# Patient Record
Sex: MALE | Race: WHITE | NOT HISPANIC OR LATINO | URBAN - METROPOLITAN AREA
[De-identification: names, ages, dates, MRNs, and addresses within clinical notes are randomized per-mention and may not be internally consistent; named-entity substitution may affect disease eponyms.]

---

## 2019-11-13 RX ORDER — FINASTERIDE 5 MG/1
5 TABLET, FILM COATED ORAL DAILY
Qty: 30 TABLET | Refills: 3 | Status: SHIPPED | OUTPATIENT
Start: 2019-11-13 | End: 2020-04-20 | Stop reason: SDUPTHER

## 2019-11-13 RX ORDER — TAMSULOSIN HYDROCHLORIDE 0.4 MG/1
0.4 CAPSULE ORAL DAILY
Qty: 30 CAPSULE | Refills: 3 | Status: SHIPPED | OUTPATIENT
Start: 2019-11-13 | End: 2020-03-03 | Stop reason: SDUPTHER

## 2019-11-13 NOTE — TELEPHONE ENCOUNTER
----- Message from Vincenzo Deutsch sent at 11/12/2019  2:42 PM CST -----  Contact: PT'S WIFE  Pt/s wife called asking for medicine (flomax and one other I didn't recognize) to be sent to [facility] in [place] , she said it was a new place , she also asked to be called back on    [phone]    When linda have been able to send the order , thank you

## 2020-03-04 RX ORDER — TAMSULOSIN HYDROCHLORIDE 0.4 MG/1
0.4 CAPSULE ORAL DAILY
Qty: 30 CAPSULE | Refills: 0 | Status: SHIPPED | OUTPATIENT
Start: 2020-03-04 | End: 2020-04-08 | Stop reason: SDUPTHER

## 2020-03-11 RX ORDER — FINASTERIDE 5 MG/1
5 TABLET, FILM COATED ORAL DAILY
Qty: 30 TABLET | Refills: 0 | Status: SHIPPED | OUTPATIENT
Start: 2020-03-11 | End: 2020-04-08 | Stop reason: SDUPTHER

## 2020-04-09 RX ORDER — FINASTERIDE 5 MG/1
5 TABLET, FILM COATED ORAL DAILY
Qty: 30 TABLET | Refills: 0 | Status: SHIPPED | OUTPATIENT
Start: 2020-04-09 | End: 2020-04-20 | Stop reason: SDUPTHER

## 2020-04-09 RX ORDER — TAMSULOSIN HYDROCHLORIDE 0.4 MG/1
0.4 CAPSULE ORAL DAILY
Qty: 30 CAPSULE | Refills: 0 | Status: SHIPPED | OUTPATIENT
Start: 2020-04-09 | End: 2020-04-20 | Stop reason: SDUPTHER

## 2020-04-20 ENCOUNTER — OFFICE VISIT (OUTPATIENT)
Dept: UROLOGY | Facility: CLINIC | Age: 75
End: 2020-04-20
Payer: MEDICARE

## 2020-04-20 VITALS
DIASTOLIC BLOOD PRESSURE: 70 MMHG | OXYGEN SATURATION: 95 % | HEART RATE: 81 BPM | TEMPERATURE: 99 F | SYSTOLIC BLOOD PRESSURE: 133 MMHG

## 2020-04-20 DIAGNOSIS — N40.1 BPH WITH URINARY OBSTRUCTION: Primary | ICD-10-CM

## 2020-04-20 DIAGNOSIS — R39.15 URINARY URGENCY: ICD-10-CM

## 2020-04-20 DIAGNOSIS — N13.8 BPH WITH URINARY OBSTRUCTION: Primary | ICD-10-CM

## 2020-04-20 LAB
BILIRUB UR QL STRIP: NEGATIVE
GLUCOSE UR QL STRIP: POSITIVE
KETONES UR QL STRIP: NEGATIVE
LEUKOCYTE ESTERASE UR QL STRIP: NEGATIVE
PH, POC UA: 5.5
POC AMORP, URINE: ABNORMAL
POC BACTI, URINE: ABNORMAL
POC BLOOD, URINE: NEGATIVE
POC CASTS, URINE: ABNORMAL
POC CRYST, URINE: ABNORMAL
POC EPITH, URINE: ABNORMAL
POC HCG, URINE: ABNORMAL
POC HYALIN, URINE: ABNORMAL LPF
POC MUCUS, URINE: ABNORMAL
POC NITRATES, URINE: NEGATIVE
POC OTHER, URINE: ABNORMAL
POC RBC, URINE: ABNORMAL HPF
POC RESIDUAL URINE VOLUME: 1 ML (ref 0–100)
POC WBC, URINE: ABNORMAL HPF
PROT UR QL STRIP: NEGATIVE
PSA, DIAGNOSTIC: 0.11 NG/ML (ref 0–4)
SP GR UR STRIP: 1.02 (ref 1–1.03)
UROBILINOGEN UR STRIP-ACNC: 0.2 (ref 0.3–2.2)

## 2020-04-20 PROCEDURE — 99214 PR OFFICE/OUTPT VISIT, EST, LEVL IV, 30-39 MIN: ICD-10-PCS | Mod: 25,S$GLB,, | Performed by: NURSE PRACTITIONER

## 2020-04-20 PROCEDURE — 99214 OFFICE O/P EST MOD 30 MIN: CPT | Mod: 25,S$GLB,, | Performed by: NURSE PRACTITIONER

## 2020-04-20 PROCEDURE — 51798 US URINE CAPACITY MEASURE: CPT | Mod: S$GLB,,, | Performed by: NURSE PRACTITIONER

## 2020-04-20 PROCEDURE — 51798 POCT BLADDER SCAN: ICD-10-PCS | Mod: S$GLB,,, | Performed by: NURSE PRACTITIONER

## 2020-04-20 RX ORDER — SITAGLIPTIN AND METFORMIN HYDROCHLORIDE 1000; 50 MG/1; MG/1
TABLET, FILM COATED ORAL
COMMUNITY
Start: 2020-04-13 | End: 2021-05-03 | Stop reason: ALTCHOICE

## 2020-04-20 RX ORDER — DULAGLUTIDE 0.75 MG/.5ML
INJECTION, SOLUTION SUBCUTANEOUS
COMMUNITY
Start: 2020-04-13 | End: 2022-01-13 | Stop reason: ALTCHOICE

## 2020-04-20 RX ORDER — METOPROLOL SUCCINATE 25 MG/1
25 TABLET, EXTENDED RELEASE ORAL DAILY
COMMUNITY
Start: 2020-04-09

## 2020-04-20 RX ORDER — LEVETIRACETAM 250 MG/1
TABLET ORAL
COMMUNITY
Start: 2020-03-27 | End: 2022-07-18

## 2020-04-20 RX ORDER — APIXABAN 5 MG/1
TABLET, FILM COATED ORAL
COMMUNITY
Start: 2020-04-13

## 2020-04-20 RX ORDER — ASPIRIN 81 MG/1
81 TABLET ORAL
COMMUNITY

## 2020-04-20 RX ORDER — GABAPENTIN 100 MG/1
CAPSULE ORAL
COMMUNITY
Start: 2020-03-27

## 2020-04-20 RX ORDER — TAMSULOSIN HYDROCHLORIDE 0.4 MG/1
0.4 CAPSULE ORAL DAILY
Qty: 30 CAPSULE | Refills: 11 | Status: SHIPPED | OUTPATIENT
Start: 2020-04-20 | End: 2021-04-20

## 2020-04-20 RX ORDER — ATORVASTATIN CALCIUM 40 MG/1
TABLET, FILM COATED ORAL
COMMUNITY
Start: 2020-03-27

## 2020-04-20 RX ORDER — DULOXETIN HYDROCHLORIDE 60 MG/1
CAPSULE, DELAYED RELEASE ORAL
COMMUNITY
Start: 2020-04-09

## 2020-04-20 RX ORDER — FINASTERIDE 5 MG/1
5 TABLET, FILM COATED ORAL DAILY
Qty: 30 TABLET | Refills: 11 | Status: SHIPPED | OUTPATIENT
Start: 2020-04-20 | End: 2020-05-12 | Stop reason: SDUPTHER

## 2020-04-20 RX ORDER — PRAMIPEXOLE DIHYDROCHLORIDE 0.5 MG/1
TABLET ORAL
COMMUNITY
Start: 2020-03-16

## 2020-04-20 NOTE — PROGRESS NOTES
Subjective:       Patient ID: Damien Wallace is a 74 y.o. male.    Chief Complaint: No chief complaint on file.      HPI: For old male, patient Dr. Layne, last seen 03/22/2019.  Patient has a history of BPH with obstruction.  Patient is on both Flomax and Proscar.  Patient has a history of urinary urgency.  This is contributed to the patient's strokes.  Patient denies pain or burning urination.  States he has a good stream.  Denies difficulty voiding.  Denies blood in his urine.  Denies weight loss.  States he has good bowel movements.  Patient does continue to have some urgency.  Patient family states it is not severe.    No other urinary complaints.  All other health problems are stable at this time.       Past Medical History:   Past Medical History:   Diagnosis Date    BPH with urinary obstruction     CAD (coronary artery disease)     Diabetes mellitus     Hypertension     Sleep apnea        Past Surgical Historical:   Past Surgical History:   Procedure Laterality Date    CYSTOSCOPY      INTRAMEDULLARY RODDING OF FEMUR      KNEE SURGERY      right hand surgery      SHOULDER SURGERY      triple bypass          Medications:   Medication List with Changes/Refills   Current Medications    ASPIRIN (ECOTRIN) 81 MG EC TABLET    Take 81 mg by mouth.    ATORVASTATIN (LIPITOR) 40 MG TABLET        DULOXETINE (CYMBALTA) 60 MG CAPSULE        ELIQUIS 5 MG TAB        GABAPENTIN (NEURONTIN) 100 MG CAPSULE        JANUMET 50-1,000 MG PER TABLET        LEVETIRACETAM (KEPPRA) 250 MG TAB        METOPROLOL SUCCINATE (TOPROL-XL) 25 MG 24 HR TABLET        PRAMIPEXOLE (MIRAPEX) 0.5 MG TABLET        TRULICITY 0.75 MG/0.5 ML PNIJ       Changed and/or Refilled Medications    Modified Medication Previous Medication    FINASTERIDE (PROSCAR) 5 MG TABLET finasteride (PROSCAR) 5 mg tablet       Take 1 tablet (5 mg total) by mouth once daily.    Take 1 tablet (5 mg total) by mouth once daily.    TAMSULOSIN (FLOMAX) 0.4 MG CAP  tamsulosin (FLOMAX) 0.4 mg Cap       Take 1 capsule (0.4 mg total) by mouth once daily.    Take 1 capsule (0.4 mg total) by mouth once daily.   Discontinued Medications    FINASTERIDE (PROSCAR) 5 MG TABLET    Take 1 tablet (5 mg total) by mouth once daily.        Past Social History:   Social History     Socioeconomic History    Marital status:      Spouse name: Not on file    Number of children: Not on file    Years of education: Not on file    Highest education level: Not on file   Occupational History    Not on file   Social Needs    Financial resource strain: Not on file    Food insecurity:     Worry: Not on file     Inability: Not on file    Transportation needs:     Medical: Not on file     Non-medical: Not on file   Tobacco Use    Smoking status: Never Smoker    Smokeless tobacco: Never Used   Substance and Sexual Activity    Alcohol use: Never     Frequency: Never    Drug use: Not on file    Sexual activity: Not on file   Lifestyle    Physical activity:     Days per week: Not on file     Minutes per session: Not on file    Stress: Not on file   Relationships    Social connections:     Talks on phone: Not on file     Gets together: Not on file     Attends Islam service: Not on file     Active member of club or organization: Not on file     Attends meetings of clubs or organizations: Not on file     Relationship status: Not on file   Other Topics Concern    Not on file   Social History Narrative    Not on file       Allergies: Review of patient's allergies indicates:  No Known Allergies     Family History: History reviewed. No pertinent family history.     Review of Systems:  Review of Systems   Constitutional: Negative for activity change and appetite change.   HENT: Negative for congestion and dental problem.    Eyes: Negative for visual disturbance.   Respiratory: Negative for chest tightness and shortness of breath.    Cardiovascular: Negative for chest pain.    Gastrointestinal: Negative for abdominal distention and abdominal pain.   Genitourinary: Positive for urgency. Negative for decreased urine volume, difficulty urinating, discharge, dysuria, enuresis, flank pain, frequency, genital sores, hematuria, penile pain, penile swelling, scrotal swelling and testicular pain.   Musculoskeletal: Negative for back pain and neck pain.   Skin: Negative for color change.   Neurological: Negative for dizziness.   Hematological: Negative for adenopathy.   Psychiatric/Behavioral: Negative for agitation, behavioral problems and confusion.       Physical Exam:  Physical Exam   Nursing note and vitals reviewed.  Constitutional: He is oriented to person, place, and time. He appears well-developed and well-nourished.   HENT:   Head: Normocephalic.   Eyes: Pupils are equal, round, and reactive to light.   Neck: Normal range of motion. Neck supple.   Cardiovascular: Normal rate, regular rhythm and normal heart sounds.    Pulmonary/Chest: Effort normal and breath sounds normal.   Abdominal: Soft. Bowel sounds are normal.   Genitourinary: Rectum normal, prostate normal and penis normal.   Genitourinary Comments: Prostate is benign, no nodules and nontender.   Musculoskeletal: Normal range of motion.   Neurological: He is alert and oriented to person, place, and time.   Skin: Skin is warm and dry.     Psychiatric: He has a normal mood and affect. His behavior is normal.     Urinalysis:  Glucose 100, otherwise normal.  Bladder scan:  1 cc.    Assessment/Plan:   1.  BPH with obstruction:  We will check the patient's PSA.  We will notify him of any abnormal findings.  Patient is on both Proscar and Flomax.  Patient will continue those as directed.  Refills sent to his pharmacy.    2.  Urinary urgency:  Patient reiterated on timed voiding and double voiding.  Advised not to hold his bladder.    Will plan follow-up in 6 months, sooner if needed.  Problem List Items Addressed This Visit     None       Visit Diagnoses     BPH with urinary obstruction    -  Primary    Relevant Medications    tamsulosin (FLOMAX) 0.4 mg Cap    finasteride (PROSCAR) 5 mg tablet    Other Relevant Orders    POCT Bladder Scan    POCT Urinalysis (w/Micro Option)    Prostate Specific Antigen, Diagnostic    Urinary urgency

## 2020-05-12 DIAGNOSIS — N40.1 BPH WITH URINARY OBSTRUCTION: ICD-10-CM

## 2020-05-12 DIAGNOSIS — N13.8 BPH WITH URINARY OBSTRUCTION: ICD-10-CM

## 2020-05-12 RX ORDER — FINASTERIDE 5 MG/1
5 TABLET, FILM COATED ORAL DAILY
Qty: 30 TABLET | Refills: 4 | Status: SHIPPED | OUTPATIENT
Start: 2020-05-12 | End: 2020-06-09 | Stop reason: SDUPTHER

## 2020-06-09 DIAGNOSIS — N13.8 BPH WITH URINARY OBSTRUCTION: ICD-10-CM

## 2020-06-09 DIAGNOSIS — N40.1 BPH WITH URINARY OBSTRUCTION: ICD-10-CM

## 2020-06-09 RX ORDER — FINASTERIDE 5 MG/1
5 TABLET, FILM COATED ORAL DAILY
Qty: 30 TABLET | Refills: 4 | Status: SHIPPED | OUTPATIENT
Start: 2020-06-09 | End: 2020-11-06

## 2020-06-16 ENCOUNTER — TELEPHONE (OUTPATIENT)
Dept: UROLOGY | Facility: CLINIC | Age: 75
End: 2020-06-16

## 2020-06-16 NOTE — TELEPHONE ENCOUNTER
----- Message from Amanda Leonard sent at 6/16/2020 12:41 PM CDT -----  Type:  Needs Medical Advice    Who Called: Damien Wallace's wife (Alyssa)    Pharmacy name and phone #:    [facility] - [place], [place] - [address]  [address] [facility] [address]  Phone: [phone] Fax: [phone]    Would the patient rather a call back or a response via [facility]? Call back   Best Call Back Number: [phone]  Additional Information: Patient's wife would like to know why hasn't someone called her back in regards to her  rx :   finasteride (PROSCAR) 5 mg tablet    Patient's wife also stated that it has been 6-9 months that her  has not received.

## 2021-01-04 DIAGNOSIS — N13.8 BPH WITH URINARY OBSTRUCTION: ICD-10-CM

## 2021-01-04 DIAGNOSIS — N40.1 BPH WITH URINARY OBSTRUCTION: ICD-10-CM

## 2021-01-04 RX ORDER — FINASTERIDE 5 MG/1
TABLET, FILM COATED ORAL
Qty: 30 TABLET | Refills: 0 | Status: SHIPPED | OUTPATIENT
Start: 2021-01-04 | End: 2021-03-30

## 2021-03-30 DIAGNOSIS — N40.1 BPH WITH URINARY OBSTRUCTION: ICD-10-CM

## 2021-03-30 DIAGNOSIS — N13.8 BPH WITH URINARY OBSTRUCTION: ICD-10-CM

## 2021-03-30 RX ORDER — FINASTERIDE 5 MG/1
TABLET, FILM COATED ORAL
Qty: 30 TABLET | Refills: 0 | Status: SHIPPED | OUTPATIENT
Start: 2021-03-30 | End: 2022-01-13 | Stop reason: SDUPTHER

## 2021-05-03 ENCOUNTER — OFFICE VISIT (OUTPATIENT)
Dept: UROLOGY | Facility: CLINIC | Age: 76
End: 2021-05-03
Payer: MEDICARE

## 2021-05-03 DIAGNOSIS — N13.8 BPH WITH URINARY OBSTRUCTION: ICD-10-CM

## 2021-05-03 DIAGNOSIS — N40.1 BPH WITH URINARY OBSTRUCTION: ICD-10-CM

## 2021-05-03 LAB — POC RESIDUAL URINE VOLUME: 0 ML (ref 0–100)

## 2021-05-03 PROCEDURE — 99214 OFFICE O/P EST MOD 30 MIN: CPT | Mod: S$GLB,,, | Performed by: NURSE PRACTITIONER

## 2021-05-03 PROCEDURE — 51798 POCT BLADDER SCAN: ICD-10-PCS | Mod: S$GLB,,, | Performed by: NURSE PRACTITIONER

## 2021-05-03 PROCEDURE — 51798 US URINE CAPACITY MEASURE: CPT | Mod: S$GLB,,, | Performed by: NURSE PRACTITIONER

## 2021-05-03 PROCEDURE — 99214 PR OFFICE/OUTPT VISIT, EST, LEVL IV, 30-39 MIN: ICD-10-PCS | Mod: S$GLB,,, | Performed by: NURSE PRACTITIONER

## 2021-05-03 RX ORDER — FINASTERIDE 5 MG/1
5 TABLET, FILM COATED ORAL DAILY
Qty: 30 TABLET | Refills: 11 | Status: SHIPPED | OUTPATIENT
Start: 2021-05-03 | End: 2022-01-13 | Stop reason: SDUPTHER

## 2021-05-03 RX ORDER — METFORMIN HYDROCHLORIDE 500 MG/1
TABLET, EXTENDED RELEASE ORAL
COMMUNITY
Start: 2021-04-27

## 2021-05-03 RX ORDER — OXYBUTYNIN CHLORIDE 5 MG/1
5 TABLET ORAL 3 TIMES DAILY
Qty: 90 TABLET | Refills: 11 | Status: SHIPPED | OUTPATIENT
Start: 2021-05-03 | End: 2022-01-13

## 2021-05-03 RX ORDER — TAMSULOSIN HYDROCHLORIDE 0.4 MG/1
0.4 CAPSULE ORAL DAILY
Qty: 30 CAPSULE | Refills: 11 | Status: SHIPPED | OUTPATIENT
Start: 2021-05-03 | End: 2022-01-13 | Stop reason: SDUPTHER

## 2021-05-03 RX ORDER — DULAGLUTIDE 1.5 MG/.5ML
INJECTION, SOLUTION SUBCUTANEOUS
COMMUNITY
Start: 2021-04-06 | End: 2022-01-13 | Stop reason: ALTCHOICE

## 2021-05-04 LAB — PSA, DIAGNOSTIC: 0.06 NG/ML (ref 0–4)

## 2021-12-27 ENCOUNTER — TELEPHONE (OUTPATIENT)
Dept: UROLOGY | Facility: CLINIC | Age: 76
End: 2021-12-27
Payer: MEDICARE

## 2021-12-28 ENCOUNTER — TELEPHONE (OUTPATIENT)
Dept: UROLOGY | Facility: CLINIC | Age: 76
End: 2021-12-28
Payer: MEDICARE

## 2022-01-13 ENCOUNTER — OFFICE VISIT (OUTPATIENT)
Dept: UROLOGY | Facility: CLINIC | Age: 77
End: 2022-01-13
Payer: MEDICARE

## 2022-01-13 VITALS — WEIGHT: 178 LBS | HEIGHT: 69 IN | BODY MASS INDEX: 26.36 KG/M2

## 2022-01-13 DIAGNOSIS — N13.8 BPH WITH URINARY OBSTRUCTION: Primary | ICD-10-CM

## 2022-01-13 DIAGNOSIS — N40.1 BPH WITH URINARY OBSTRUCTION: Primary | ICD-10-CM

## 2022-01-13 PROCEDURE — 99214 OFFICE O/P EST MOD 30 MIN: CPT | Mod: S$GLB,,, | Performed by: NURSE PRACTITIONER

## 2022-01-13 PROCEDURE — 51798 POCT BLADDER SCAN: ICD-10-PCS | Mod: S$GLB,,, | Performed by: NURSE PRACTITIONER

## 2022-01-13 PROCEDURE — 99214 PR OFFICE/OUTPT VISIT, EST, LEVL IV, 30-39 MIN: ICD-10-PCS | Mod: S$GLB,,, | Performed by: NURSE PRACTITIONER

## 2022-01-13 PROCEDURE — 51798 US URINE CAPACITY MEASURE: CPT | Mod: S$GLB,,, | Performed by: NURSE PRACTITIONER

## 2022-01-13 RX ORDER — TAMSULOSIN HYDROCHLORIDE 0.4 MG/1
0.4 CAPSULE ORAL DAILY
Qty: 30 CAPSULE | Refills: 11 | Status: SHIPPED | OUTPATIENT
Start: 2022-01-13 | End: 2023-01-19 | Stop reason: SDUPTHER

## 2022-01-13 RX ORDER — FINASTERIDE 5 MG/1
5 TABLET, FILM COATED ORAL DAILY
Qty: 30 TABLET | Refills: 11 | Status: SHIPPED | OUTPATIENT
Start: 2022-01-13 | End: 2023-01-19 | Stop reason: SDUPTHER

## 2022-01-13 RX ORDER — SEMAGLUTIDE 1.34 MG/ML
INJECTION, SOLUTION SUBCUTANEOUS
COMMUNITY
Start: 2021-12-14

## 2022-01-13 RX ORDER — OXYBUTYNIN CHLORIDE 15 MG/1
15 TABLET, EXTENDED RELEASE ORAL DAILY
Qty: 30 TABLET | Refills: 11 | Status: SHIPPED | OUTPATIENT
Start: 2022-01-13 | End: 2023-01-31

## 2022-01-13 NOTE — PROGRESS NOTES
Subjective:       Patient ID: Damien Wallace is a 76 y.o. male.    Chief Complaint: Benign Prostatic Hypertrophy (No problems with urination)      HPI: 76-year-old male six-month follow-up history of BPH with LUTS and overactive bladder.  Patient is on finasteride 5 mg tamsulosin 0.4 mg reports be voiding well.  He is on oxybutynin 5 mg b.i.d. still having some breakthrough urgency with urge incontinence.  Patient denies any dysuria or gross hematuria.  No flank pain.  Last PSA May of 2021 resulting in at 0.059 no other urologic concerns       Past Medical History:   Past Medical History:   Diagnosis Date    BPH with urinary obstruction     CAD (coronary artery disease)     Dementia     Diabetes mellitus     Hypertension     Sleep apnea        Past Surgical Historical:   Past Surgical History:   Procedure Laterality Date    CYSTOSCOPY      INTRAMEDULLARY RODDING OF FEMUR      KNEE SURGERY      right hand surgery      SHOULDER SURGERY      triple bypass          Medications:   Medication List with Changes/Refills   New Medications    OXYBUTYNIN (DITROPAN XL) 15 MG TR24    Take 1 tablet (15 mg total) by mouth once daily.   Current Medications    ASPIRIN (ECOTRIN) 81 MG EC TABLET    Take 81 mg by mouth.    ATORVASTATIN (LIPITOR) 40 MG TABLET        DULOXETINE (CYMBALTA) 60 MG CAPSULE        ELIQUIS 5 MG TAB        GABAPENTIN (NEURONTIN) 100 MG CAPSULE        LEVETIRACETAM (KEPPRA) 250 MG TAB        METFORMIN (GLUCOPHAGE-XR) 500 MG ER 24HR TABLET        METOPROLOL SUCCINATE (TOPROL-XL) 25 MG 24 HR TABLET        OZEMPIC 0.25 MG OR 0.5 MG(2 MG/1.5 ML) PEN INJECTOR        PRAMIPEXOLE (MIRAPEX) 0.5 MG TABLET       Changed and/or Refilled Medications    Modified Medication Previous Medication    FINASTERIDE (PROSCAR) 5 MG TABLET finasteride (PROSCAR) 5 mg tablet       Take 1 tablet (5 mg total) by mouth once daily.    Take 1 tablet (5 mg total) by mouth once daily.    TAMSULOSIN (FLOMAX) 0.4 MG CAP tamsulosin  (FLOMAX) 0.4 mg Cap       Take 1 capsule (0.4 mg total) by mouth once daily.    Take 1 capsule (0.4 mg total) by mouth once daily.   Discontinued Medications    FINASTERIDE (PROSCAR) 5 MG TABLET    TAKE 1 TABLET BY MOUTH ONCE DAILY    OXYBUTYNIN (DITROPAN) 5 MG TAB    Take 1 tablet (5 mg total) by mouth 3 (three) times daily.    TRULICITY 0.75 MG/0.5 ML PNIJ        TRULICITY 1.5 MG/0.5 ML PEN INJECTOR            Past Social History:   Social History     Socioeconomic History    Marital status:    Tobacco Use    Smoking status: Never Smoker    Smokeless tobacco: Never Used   Substance and Sexual Activity    Alcohol use: Never       Allergies: Review of patient's allergies indicates:  No Known Allergies     Family History: History reviewed. No pertinent family history.     Review of Systems:  Review of Systems   Constitutional: Negative for activity change and appetite change.   HENT: Negative for congestion and dental problem.    Eyes: Negative for visual disturbance.   Respiratory: Negative for chest tightness and shortness of breath.    Cardiovascular: Negative for chest pain.   Gastrointestinal: Negative for abdominal distention and abdominal pain.   Genitourinary: Positive for enuresis and urgency. Negative for decreased urine volume, difficulty urinating, dysuria, flank pain, frequency, genital sores, hematuria, penile discharge, penile pain, penile swelling, scrotal swelling and testicular pain.   Musculoskeletal: Negative for back pain and neck pain.   Skin: Negative for color change.   Neurological: Negative for dizziness.   Hematological: Negative for adenopathy.   Psychiatric/Behavioral: Negative for agitation, behavioral problems and confusion.       Physical Exam:  Physical Exam  Constitutional:       Appearance: He is well-developed and well-nourished.   HENT:      Head: Normocephalic.   Eyes:      General: No scleral icterus.  Cardiovascular:      Pulses: Intact distal pulses.   Pulmonary:       Effort: Pulmonary effort is normal.      Breath sounds: Normal breath sounds.   Abdominal:      General: There is no distension.      Palpations: Abdomen is soft.      Tenderness: There is no abdominal tenderness.      Hernia: No hernia is present. There is no hernia in the right inguinal area or left inguinal area.   Genitourinary:     Penis: Normal.       Testes: Normal. Cremasteric reflex is present.   Musculoskeletal:      Cervical back: Normal range of motion.   Skin:     General: Skin is warm and dry.   Neurological:      Mental Status: He is alert and oriented to person, place, and time.   Psychiatric:         Mood and Affect: Mood and affect normal.         Assessment/Plan:   BPH with LUTS--PVR 36 mL.  Refilled his finasteride/tamsulosin.  PSA LISA next visit    Overactive bladder--discontinue quick release oxybutynin; new script for extended release oxybutynin sent pharmacy record  Problem List Items Addressed This Visit    None     Visit Diagnoses     BPH with urinary obstruction    -  Primary    Relevant Medications    finasteride (PROSCAR) 5 mg tablet    tamsulosin (FLOMAX) 0.4 mg Cap    Other Relevant Orders    POCT Bladder Scan

## 2022-01-14 LAB — POC RESIDUAL URINE VOLUME: 74 ML (ref 0–100)

## 2022-07-18 ENCOUNTER — OFFICE VISIT (OUTPATIENT)
Dept: UROLOGY | Facility: CLINIC | Age: 77
End: 2022-07-18
Payer: MEDICARE

## 2022-07-18 ENCOUNTER — TELEPHONE (OUTPATIENT)
Dept: UROLOGY | Facility: CLINIC | Age: 77
End: 2022-07-18

## 2022-07-18 VITALS
SYSTOLIC BLOOD PRESSURE: 113 MMHG | HEIGHT: 69 IN | HEART RATE: 66 BPM | WEIGHT: 178 LBS | DIASTOLIC BLOOD PRESSURE: 66 MMHG | BODY MASS INDEX: 26.36 KG/M2

## 2022-07-18 DIAGNOSIS — N32.81 OAB (OVERACTIVE BLADDER): ICD-10-CM

## 2022-07-18 DIAGNOSIS — N13.8 BPH WITH URINARY OBSTRUCTION: Primary | ICD-10-CM

## 2022-07-18 DIAGNOSIS — N40.1 BPH WITH URINARY OBSTRUCTION: Primary | ICD-10-CM

## 2022-07-18 LAB
POC RESIDUAL URINE VOLUME: 0 ML (ref 0–100)
PSA, DIAGNOSTIC: 0.11 NG/ML (ref 0–4)

## 2022-07-18 PROCEDURE — 51798 US URINE CAPACITY MEASURE: CPT | Mod: S$GLB,,, | Performed by: NURSE PRACTITIONER

## 2022-07-18 PROCEDURE — 51798 POCT BLADDER SCAN: ICD-10-PCS | Mod: S$GLB,,, | Performed by: NURSE PRACTITIONER

## 2022-07-18 PROCEDURE — 99214 PR OFFICE/OUTPT VISIT, EST, LEVL IV, 30-39 MIN: ICD-10-PCS | Mod: S$GLB,,, | Performed by: NURSE PRACTITIONER

## 2022-07-18 PROCEDURE — 3074F PR MOST RECENT SYSTOLIC BLOOD PRESSURE < 130 MM HG: ICD-10-PCS | Mod: CPTII,S$GLB,, | Performed by: NURSE PRACTITIONER

## 2022-07-18 PROCEDURE — 1159F PR MEDICATION LIST DOCUMENTED IN MEDICAL RECORD: ICD-10-PCS | Mod: CPTII,S$GLB,, | Performed by: NURSE PRACTITIONER

## 2022-07-18 PROCEDURE — 1160F PR REVIEW ALL MEDS BY PRESCRIBER/CLIN PHARMACIST DOCUMENTED: ICD-10-PCS | Mod: CPTII,S$GLB,, | Performed by: NURSE PRACTITIONER

## 2022-07-18 PROCEDURE — 3074F SYST BP LT 130 MM HG: CPT | Mod: CPTII,S$GLB,, | Performed by: NURSE PRACTITIONER

## 2022-07-18 PROCEDURE — 3078F PR MOST RECENT DIASTOLIC BLOOD PRESSURE < 80 MM HG: ICD-10-PCS | Mod: CPTII,S$GLB,, | Performed by: NURSE PRACTITIONER

## 2022-07-18 PROCEDURE — 99214 OFFICE O/P EST MOD 30 MIN: CPT | Mod: S$GLB,,, | Performed by: NURSE PRACTITIONER

## 2022-07-18 PROCEDURE — 1160F RVW MEDS BY RX/DR IN RCRD: CPT | Mod: CPTII,S$GLB,, | Performed by: NURSE PRACTITIONER

## 2022-07-18 PROCEDURE — 3078F DIAST BP <80 MM HG: CPT | Mod: CPTII,S$GLB,, | Performed by: NURSE PRACTITIONER

## 2022-07-18 PROCEDURE — 1159F MED LIST DOCD IN RCRD: CPT | Mod: CPTII,S$GLB,, | Performed by: NURSE PRACTITIONER

## 2022-07-18 RX ORDER — LEVETIRACETAM 750 MG/1
TABLET ORAL
COMMUNITY
Start: 2022-07-07

## 2022-07-18 RX ORDER — CHOLECALCIFEROL (VITAMIN D3) 25 MCG
1000 TABLET ORAL DAILY
COMMUNITY

## 2022-07-18 RX ORDER — DONEPEZIL HYDROCHLORIDE 5 MG/1
TABLET, FILM COATED ORAL
COMMUNITY
Start: 2022-03-08

## 2022-07-18 NOTE — TELEPHONE ENCOUNTER
Patient and wife notified of lab results. Asked to mail copy to them. [facility] LPN    ----- Message from Get Karimi NP sent at 7/18/2022  5:09 PM CDT -----  PSA is WNL.

## 2022-07-18 NOTE — PROGRESS NOTES
Subjective:       Patient ID: Damien Wallace is a 77 y.o. male.    Chief Complaint: Benign Prostatic Hypertrophy (6mth fu/PSA and LISA due)      HPI: 77-year-old male, established patient, presents for 6 month visit.  Patient has history BPH with obstruction.  Patient is managed on Flomax 0.4 mg daily.  Patient has been on Proscar 5 mg daily.  However, patient's wife states she has not again the medication.  She thought she was to stop it.    Patient has history of overactive bladder.  At last visit patient was switched from oxybutynin b.i.d. to oxybutynin XL 15 mg daily.  Patient patient last states he is doing well on oxybutynin XL.  He still may have rare occasions of urgency but overall is happy with results.    Patient denies any pain or burning urination.  Denies any difficulty voiding.  States he has a pretty good stream from start to finish.  Denies any significant weight loss.  Denies any blood in urine.  Denies any odor urine.  Denies any fever or body aches.  No other urinary complaints at this time.       Past Medical History:   Past Medical History:   Diagnosis Date    BPH with urinary obstruction     CAD (coronary artery disease)     Dementia     Diabetes mellitus     Hypertension     Sleep apnea        Past Surgical Historical:   Past Surgical History:   Procedure Laterality Date    CYSTOSCOPY      INTRAMEDULLARY RODDING OF FEMUR      KNEE SURGERY      right hand surgery      SHOULDER SURGERY      triple bypass          Medications:   Medication List with Changes/Refills   Current Medications    ASPIRIN (ECOTRIN) 81 MG EC TABLET    Take 81 mg by mouth.    ATORVASTATIN (LIPITOR) 40 MG TABLET        DONEPEZIL (ARICEPT) 5 MG TABLET        DULOXETINE (CYMBALTA) 60 MG CAPSULE        ELIQUIS 5 MG TAB        FINASTERIDE (PROSCAR) 5 MG TABLET    Take 1 tablet (5 mg total) by mouth once daily.    GABAPENTIN (NEURONTIN) 100 MG CAPSULE        LEVETIRACETAM (KEPPRA) 750 MG TAB        METFORMIN  (GLUCOPHAGE-XR) 500 MG ER 24HR TABLET        METOPROLOL SUCCINATE (TOPROL-XL) 25 MG 24 HR TABLET    25 mg once daily. 1/2 tab daily    OXYBUTYNIN (DITROPAN XL) 15 MG TR24    Take 1 tablet (15 mg total) by mouth once daily.    OZEMPIC 0.25 MG OR 0.5 MG(2 MG/1.5 ML) PEN INJECTOR        PRAMIPEXOLE (MIRAPEX) 0.5 MG TABLET        TAMSULOSIN (FLOMAX) 0.4 MG CAP    Take 1 capsule (0.4 mg total) by mouth once daily.    VITAMIN D (VITAMIN D3) 1000 UNITS TAB    Take 1,000 Units by mouth once daily.    ZOLEDRONIC ACID/MANNITOL-WATER (RECLAST IV)    Inject into the vein every 6 (six) months. Pt takes once a year only   Discontinued Medications    LEVETIRACETAM (KEPPRA) 250 MG TAB            Past Social History:   Social History     Socioeconomic History    Marital status:    Tobacco Use    Smoking status: Never Smoker    Smokeless tobacco: Never Used   Substance and Sexual Activity    Alcohol use: Never       Allergies: Review of patient's allergies indicates:  No Known Allergies     Family History:   Family History   Problem Relation Age of Onset    No Known Problems Father     No Known Problems Mother         Review of Systems:  Review of Systems   Constitutional: Negative for activity change and appetite change.   HENT: Negative for congestion and dental problem.    Eyes: Negative for visual disturbance.   Respiratory: Negative for chest tightness and shortness of breath.    Cardiovascular: Negative for chest pain.   Gastrointestinal: Negative for abdominal distention and abdominal pain.   Genitourinary: Negative for decreased urine volume, difficulty urinating, dysuria, enuresis, flank pain, frequency, genital sores, hematuria, penile discharge, penile pain, penile swelling, scrotal swelling, testicular pain and urgency.   Musculoskeletal: Negative for back pain and neck pain.   Skin: Negative for color change.   Neurological: Negative for dizziness.   Hematological: Negative for adenopathy.    Psychiatric/Behavioral: Negative for agitation, behavioral problems and confusion.       Physical Exam:  Physical Exam  Vitals and nursing note reviewed.   Constitutional:       Appearance: He is well-developed.   HENT:      Head: Normocephalic.   Eyes:      Pupils: Pupils are equal, round, and reactive to light.   Cardiovascular:      Rate and Rhythm: Normal rate and regular rhythm.      Heart sounds: Normal heart sounds.   Pulmonary:      Effort: Pulmonary effort is normal.      Breath sounds: Normal breath sounds.   Abdominal:      General: Bowel sounds are normal.      Palpations: Abdomen is soft.   Genitourinary:     Penis: Normal.       Prostate: Normal.      Rectum: Normal.      Comments: Deferred LISA.  Musculoskeletal:         General: Normal range of motion.      Cervical back: Normal range of motion and neck supple.   Skin:     General: Skin is warm and dry.   Neurological:      Mental Status: He is alert and oriented to person, place, and time.   Psychiatric:         Behavior: Behavior normal.       Urinalysis:  Glucose 250, protein 100, otherwise normal.  Bladder scan:  0 cc    Assessment/Plan:   1. BPH with obstruction:  Check the patient's PSA.  We will notify him of the results.  Patient continue Flomax 0.4 mg daily.  Patient is not taking Proscar at this time.  Will continue to hold.    2. Overactive bladder:  Patient is doing well on oxybutynin XL 15 mg daily.  Patient will continue as directed.    Will plan follow-up in 6 months, sooner if needed.  Problem List Items Addressed This Visit    None     Visit Diagnoses     BPH with urinary obstruction    -  Primary    Relevant Orders    POCT Urinalysis (w/Micro Option)    POCT Bladder Scan    Prostate Specific Antigen, Diagnostic    OAB (overactive bladder)        Relevant Orders    POCT Urinalysis (w/Micro Option)    POCT Bladder Scan

## 2023-01-19 ENCOUNTER — OFFICE VISIT (OUTPATIENT)
Dept: UROLOGY | Facility: CLINIC | Age: 78
End: 2023-01-19
Payer: MEDICARE

## 2023-01-19 VITALS — HEIGHT: 69 IN | RESPIRATION RATE: 20 BRPM | BODY MASS INDEX: 26.36 KG/M2 | WEIGHT: 178 LBS

## 2023-01-19 DIAGNOSIS — N40.1 BPH WITH URINARY OBSTRUCTION: Primary | ICD-10-CM

## 2023-01-19 DIAGNOSIS — N13.8 BPH WITH URINARY OBSTRUCTION: Primary | ICD-10-CM

## 2023-01-19 DIAGNOSIS — N32.81 OAB (OVERACTIVE BLADDER): ICD-10-CM

## 2023-01-19 LAB — POC RESIDUAL URINE VOLUME: 2 ML (ref 0–100)

## 2023-01-19 PROCEDURE — 1160F RVW MEDS BY RX/DR IN RCRD: CPT | Mod: CPTII,S$GLB,, | Performed by: NURSE PRACTITIONER

## 2023-01-19 PROCEDURE — 99214 PR OFFICE/OUTPT VISIT, EST, LEVL IV, 30-39 MIN: ICD-10-PCS | Mod: S$GLB,,, | Performed by: NURSE PRACTITIONER

## 2023-01-19 PROCEDURE — 51798 US URINE CAPACITY MEASURE: CPT | Mod: S$GLB,,, | Performed by: NURSE PRACTITIONER

## 2023-01-19 PROCEDURE — 1159F PR MEDICATION LIST DOCUMENTED IN MEDICAL RECORD: ICD-10-PCS | Mod: CPTII,S$GLB,, | Performed by: NURSE PRACTITIONER

## 2023-01-19 PROCEDURE — 99214 OFFICE O/P EST MOD 30 MIN: CPT | Mod: S$GLB,,, | Performed by: NURSE PRACTITIONER

## 2023-01-19 PROCEDURE — 51798 POCT BLADDER SCAN: ICD-10-PCS | Mod: S$GLB,,, | Performed by: NURSE PRACTITIONER

## 2023-01-19 PROCEDURE — 1160F PR REVIEW ALL MEDS BY PRESCRIBER/CLIN PHARMACIST DOCUMENTED: ICD-10-PCS | Mod: CPTII,S$GLB,, | Performed by: NURSE PRACTITIONER

## 2023-01-19 PROCEDURE — 1159F MED LIST DOCD IN RCRD: CPT | Mod: CPTII,S$GLB,, | Performed by: NURSE PRACTITIONER

## 2023-01-19 RX ORDER — MEMANTINE HYDROCHLORIDE 5 MG/1
TABLET ORAL
COMMUNITY
Start: 2022-12-20

## 2023-01-19 RX ORDER — FINASTERIDE 5 MG/1
5 TABLET, FILM COATED ORAL DAILY
Qty: 90 TABLET | Refills: 3 | Status: SHIPPED | OUTPATIENT
Start: 2023-01-19 | End: 2024-01-19

## 2023-01-19 RX ORDER — TAMSULOSIN HYDROCHLORIDE 0.4 MG/1
0.4 CAPSULE ORAL DAILY
Qty: 90 CAPSULE | Refills: 3 | Status: SHIPPED | OUTPATIENT
Start: 2023-01-19 | End: 2024-01-19

## 2023-01-19 NOTE — PROGRESS NOTES
Subjective:       Patient ID: Damien Wallace is a 77 y.o. male.    Chief Complaint: 6 mth f/u and BPH with Obs      HPI: 77-year-old male, established patient, presents for 6 month visit.    Patient has history of BPH with obstruction.  He is maintained on Flomax 0.4 mg daily and Proscar 5 mg daily.    Patient also has history of overactive bladder.  He has been on oxybutynin XL 15 mg daily.    Patient does complain of worsening of urgency.    Denies any significant frequency or nocturia.  Denies any pain or burning urination.  Denies any odor to the urine.  Denies any fever or body aches.  Denies any blood in urine.  Denies any significant weight loss.      Patient does admit to drinking 2 cups of coffee per day and 1 Dr. Pepper.      No other urinary complaints at this time.       Past Medical History:   Past Medical History:   Diagnosis Date    BPH with urinary obstruction     CAD (coronary artery disease)     Dementia     Diabetes mellitus     Hypertension     Sleep apnea        Past Surgical Historical:   Past Surgical History:   Procedure Laterality Date    CYSTOSCOPY      INTRAMEDULLARY RODDING OF FEMUR      KNEE SURGERY      right hand surgery      SHOULDER SURGERY      triple bypass          Medications:   Medication List with Changes/Refills   Current Medications    ASPIRIN (ECOTRIN) 81 MG EC TABLET    Take 81 mg by mouth.    ATORVASTATIN (LIPITOR) 40 MG TABLET        DONEPEZIL (ARICEPT) 5 MG TABLET        DULOXETINE (CYMBALTA) 60 MG CAPSULE        ELIQUIS 5 MG TAB        GABAPENTIN (NEURONTIN) 100 MG CAPSULE        LEVETIRACETAM (KEPPRA) 750 MG TAB        MEMANTINE (NAMENDA) 5 MG TAB        METFORMIN (GLUCOPHAGE-XR) 500 MG ER 24HR TABLET        METOPROLOL SUCCINATE (TOPROL-XL) 25 MG 24 HR TABLET    25 mg once daily. 1/2 tab daily    OXYBUTYNIN (DITROPAN XL) 15 MG TR24    Take 1 tablet (15 mg total) by mouth once daily.    OZEMPIC 0.25 MG OR 0.5 MG(2 MG/1.5 ML) PEN INJECTOR        PRAMIPEXOLE (MIRAPEX) 0.5  MG TABLET        VITAMIN D (VITAMIN D3) 1000 UNITS TAB    Take 1,000 Units by mouth once daily.    ZOLEDRONIC ACID/MANNITOL-WATER (RECLAST IV)    Inject into the vein every 6 (six) months. Pt takes once a year only   Changed and/or Refilled Medications    Modified Medication Previous Medication    FINASTERIDE (PROSCAR) 5 MG TABLET finasteride (PROSCAR) 5 mg tablet       Take 1 tablet (5 mg total) by mouth once daily.    Take 1 tablet (5 mg total) by mouth once daily.    TAMSULOSIN (FLOMAX) 0.4 MG CAP tamsulosin (FLOMAX) 0.4 mg Cap       Take 1 capsule (0.4 mg total) by mouth once daily.    Take 1 capsule (0.4 mg total) by mouth once daily.        Past Social History:   Social History     Socioeconomic History    Marital status:    Tobacco Use    Smoking status: Never    Smokeless tobacco: Never   Substance and Sexual Activity    Alcohol use: Never       Allergies: Review of patient's allergies indicates:  No Known Allergies     Family History:   Family History   Problem Relation Age of Onset    No Known Problems Father     No Known Problems Mother         Review of Systems:  Review of Systems   Constitutional:  Negative for activity change and appetite change.   HENT:  Negative for congestion and dental problem.    Eyes:  Negative for visual disturbance.   Respiratory:  Negative for chest tightness and shortness of breath.    Cardiovascular:  Negative for chest pain.   Gastrointestinal:  Negative for abdominal distention and abdominal pain.   Genitourinary:  Positive for urgency. Negative for decreased urine volume, difficulty urinating, dysuria, enuresis, flank pain, frequency, genital sores, hematuria, penile discharge, penile pain, penile swelling, scrotal swelling and testicular pain.   Musculoskeletal:  Negative for back pain and neck pain.   Skin:  Negative for color change.   Neurological:  Negative for dizziness.   Hematological:  Negative for adenopathy.   Psychiatric/Behavioral:  Negative for  agitation, behavioral problems and confusion.      Physical Exam:  Physical Exam  Vitals and nursing note reviewed.   Constitutional:       Appearance: He is well-developed.   HENT:      Head: Normocephalic.   Eyes:      Pupils: Pupils are equal, round, and reactive to light.   Cardiovascular:      Rate and Rhythm: Normal rate and regular rhythm.      Heart sounds: Normal heart sounds.   Pulmonary:      Effort: Pulmonary effort is normal.      Breath sounds: Normal breath sounds.   Abdominal:      General: Bowel sounds are normal.      Palpations: Abdomen is soft.   Genitourinary:     Comments: Patient declines LISA  Musculoskeletal:         General: Normal range of motion.      Cervical back: Normal range of motion and neck supple.   Skin:     General: Skin is warm and dry.   Neurological:      Mental Status: He is alert and oriented to person, place, and time.   Psychiatric:         Behavior: Behavior normal.     Bladder scan: 2 cc    Assessment/Plan:   1. BPH with obstruction:  Patient's PSA was done in July.  PSA at that time was 0.107.    Patient is doing well on Flomax and Proscar.  Patient will continue as directed.  Refill sent to pharmacy.      2. Overactive bladder:  Oxybutynin is no longer effective.    Patient will do 4 week trial of Gemtesa 75 mg daily.    Will notify us for the results.  Will send out prescription if it is effective.      Will plan follow-up in 6 months, sooner if needed.  Problem List Items Addressed This Visit    None  Visit Diagnoses       BPH with urinary obstruction    -  Primary    Relevant Medications    tamsulosin (FLOMAX) 0.4 mg Cap    finasteride (PROSCAR) 5 mg tablet    Other Relevant Orders    POCT Bladder Scan    OAB (overactive bladder)        Relevant Orders    POCT Bladder Scan

## 2023-01-31 NOTE — TELEPHONE ENCOUNTER
----- Message from Josselin Denis sent at 1/31/2023 10:21 AM CST -----  Regarding: Medciation  Contact: wife-andria  Per phone call with pt wife Andria the provider prescribed the PT with samples of the gemtesa 75mg 1X per day and he is wanting to get the medication bc it is working great for him. Return call [phone]    [facility] - [address] [address]  Phone: [phone] Fax: [phone]

## 2023-02-07 ENCOUNTER — TELEPHONE (OUTPATIENT)
Dept: UROLOGY | Facility: CLINIC | Age: 78
End: 2023-02-07
Payer: MEDICARE

## 2023-02-14 ENCOUNTER — TELEPHONE (OUTPATIENT)
Dept: UROLOGY | Facility: CLINIC | Age: 78
End: 2023-02-14
Payer: MEDICARE

## 2023-02-14 NOTE — TELEPHONE ENCOUNTER
----- Message from Funmilayo Chanel sent at 2/13/2023  6:47 PM CST -----  Contact: Mrs. Wallace(wife)    ----- Message -----  From: Danielle Griffin  Sent: 2/13/2023   2:51 PM CST  To: Trev Deutsch Staff    Mrs. Wallace called to consult with nurse or staff regarding samples the patient received. She states their pharmacy is not able to get the Gemtesa to fill and wanted to let the office know. She would like a call back and can be reached at  [phone]. Thanks/MR      
Pt's spouse not on involvement of care but reports pharmacy in [place] can not get the medication that was ordered. I did explain that pt's can get with pharmacy and transfer. Pt's spouse states that she felt like this was too much of a difficulty and will just find another doctor.    Noted: Ms Wallace reports she has POA but no copy in chart. I advised she get us a copy.  
[facility]

## 2023-03-13 NOTE — TELEPHONE ENCOUNTER
Received refill request for pt on the oxybutynin. Pt's wife reports she has POA, not found in chart. She states we have it and when we find it we can call back, call lost or hung up. I could not find involvement of care either.

## 2023-03-14 ENCOUNTER — TELEPHONE (OUTPATIENT)
Dept: UROLOGY | Facility: CLINIC | Age: 78
End: 2023-03-14
Payer: MEDICARE

## 2023-03-14 NOTE — TELEPHONE ENCOUNTER
Pt's spouse not on involvement of care, explained that then call was ost or she hung up. (Oxybutynin dc'd by [facility])

## 2023-03-14 NOTE — TELEPHONE ENCOUNTER
----- Message from Mariya Paz sent at 3/14/2023 10:00 AM CDT -----  Contact: Alyssa/wife  Type:  RX Refill Request    Who Called:  Alyssa  Refill or New Rx: refill   RX Name and Strength: Oxybutynin 15mg   How is the patient currently taking it? (ex. 1XDay): 1xday  Is this a 30 day or 90 day RX: 90  Preferred Pharmacy with phone number:   [facility] - [address] [address]  Phone: [phone] Fax: [phone]  Local or Mail Order: Local  Ordering Provider: Pato Jade/ABHISHEK  Would the patient rather a call back or a response via [facility]? Call back   Best Call Back Number: Please call her at [phone]  Additional Information:

## 2023-06-08 ENCOUNTER — OFFICE VISIT (OUTPATIENT)
Dept: UROLOGY | Facility: CLINIC | Age: 78
End: 2023-06-08
Payer: MEDICARE

## 2023-06-08 VITALS
HEIGHT: 69 IN | RESPIRATION RATE: 18 BRPM | DIASTOLIC BLOOD PRESSURE: 59 MMHG | HEART RATE: 74 BPM | SYSTOLIC BLOOD PRESSURE: 97 MMHG | BODY MASS INDEX: 26.36 KG/M2 | WEIGHT: 178 LBS

## 2023-06-08 DIAGNOSIS — N13.8 BPH WITH URINARY OBSTRUCTION: ICD-10-CM

## 2023-06-08 DIAGNOSIS — N32.81 OAB (OVERACTIVE BLADDER): Primary | ICD-10-CM

## 2023-06-08 DIAGNOSIS — N40.1 BPH WITH URINARY OBSTRUCTION: ICD-10-CM

## 2023-06-08 LAB
BILIRUBIN, UA POC OHS: NEGATIVE
BLOOD, UA POC OHS: NEGATIVE
CLARITY, UA POC OHS: CLEAR
COLOR, UA POC OHS: YELLOW
GLUCOSE, UA POC OHS: NEGATIVE
KETONES, UA POC OHS: NEGATIVE
LEUKOCYTES, UA POC OHS: NEGATIVE
NITRITE, UA POC OHS: NEGATIVE
PH, UA POC OHS: 5.5
PROTEIN, UA POC OHS: NEGATIVE
SPECIFIC GRAVITY, UA POC OHS: >=1.03
UROBILINOGEN, UA POC OHS: 1

## 2023-06-08 PROCEDURE — 1160F PR REVIEW ALL MEDS BY PRESCRIBER/CLIN PHARMACIST DOCUMENTED: ICD-10-PCS | Mod: CPTII,S$GLB,, | Performed by: NURSE PRACTITIONER

## 2023-06-08 PROCEDURE — 81003 URINALYSIS AUTO W/O SCOPE: CPT | Mod: QW,S$GLB,, | Performed by: NURSE PRACTITIONER

## 2023-06-08 PROCEDURE — 81003 POCT URINALYSIS(INSTRUMENT): ICD-10-PCS | Mod: QW,S$GLB,, | Performed by: NURSE PRACTITIONER

## 2023-06-08 PROCEDURE — 3288F PR FALLS RISK ASSESSMENT DOCUMENTED: ICD-10-PCS | Mod: CPTII,S$GLB,, | Performed by: NURSE PRACTITIONER

## 2023-06-08 PROCEDURE — 1100F PR PT FALLS ASSESS DOC 2+ FALLS/FALL W/INJURY/YR: ICD-10-PCS | Mod: CPTII,S$GLB,, | Performed by: NURSE PRACTITIONER

## 2023-06-08 PROCEDURE — 3074F SYST BP LT 130 MM HG: CPT | Mod: CPTII,S$GLB,, | Performed by: NURSE PRACTITIONER

## 2023-06-08 PROCEDURE — 1159F PR MEDICATION LIST DOCUMENTED IN MEDICAL RECORD: ICD-10-PCS | Mod: CPTII,S$GLB,, | Performed by: NURSE PRACTITIONER

## 2023-06-08 PROCEDURE — 3078F DIAST BP <80 MM HG: CPT | Mod: CPTII,S$GLB,, | Performed by: NURSE PRACTITIONER

## 2023-06-08 PROCEDURE — 99214 PR OFFICE/OUTPT VISIT, EST, LEVL IV, 30-39 MIN: ICD-10-PCS | Mod: S$GLB,,, | Performed by: NURSE PRACTITIONER

## 2023-06-08 PROCEDURE — 1126F PR PAIN SEVERITY QUANTIFIED, NO PAIN PRESENT: ICD-10-PCS | Mod: CPTII,S$GLB,, | Performed by: NURSE PRACTITIONER

## 2023-06-08 PROCEDURE — 3074F PR MOST RECENT SYSTOLIC BLOOD PRESSURE < 130 MM HG: ICD-10-PCS | Mod: CPTII,S$GLB,, | Performed by: NURSE PRACTITIONER

## 2023-06-08 PROCEDURE — 99214 OFFICE O/P EST MOD 30 MIN: CPT | Mod: S$GLB,,, | Performed by: NURSE PRACTITIONER

## 2023-06-08 PROCEDURE — 1126F AMNT PAIN NOTED NONE PRSNT: CPT | Mod: CPTII,S$GLB,, | Performed by: NURSE PRACTITIONER

## 2023-06-08 PROCEDURE — 1159F MED LIST DOCD IN RCRD: CPT | Mod: CPTII,S$GLB,, | Performed by: NURSE PRACTITIONER

## 2023-06-08 PROCEDURE — 3078F PR MOST RECENT DIASTOLIC BLOOD PRESSURE < 80 MM HG: ICD-10-PCS | Mod: CPTII,S$GLB,, | Performed by: NURSE PRACTITIONER

## 2023-06-08 PROCEDURE — 1100F PTFALLS ASSESS-DOCD GE2>/YR: CPT | Mod: CPTII,S$GLB,, | Performed by: NURSE PRACTITIONER

## 2023-06-08 PROCEDURE — 1160F RVW MEDS BY RX/DR IN RCRD: CPT | Mod: CPTII,S$GLB,, | Performed by: NURSE PRACTITIONER

## 2023-06-08 PROCEDURE — 3288F FALL RISK ASSESSMENT DOCD: CPT | Mod: CPTII,S$GLB,, | Performed by: NURSE PRACTITIONER

## 2023-06-08 NOTE — PROGRESS NOTES
Subjective:       Patient ID: Damien Wallace is a 77 y.o. male.    Chief Complaint: Flank Pain (left), Urinary Frequency, and urge incontinence      HPI: 77-year-old male, established patient, last seen January 2023.    Patient's history BPH with obstruction.    At last visit patient was having episodes of urinary incontinence, urinary frequency, and urinary urgency.    Patient had been on oxybutynin with no improvement.  Start the patient on Gemtesa 75 mg daily.  After being on samples patient had improvement in symptoms.  We then sent a prescription to his pharmacy in [place].  However, patient's spouse called saying that the medication was not available.  Patient's uptake any Flomax and Proscar he was on.  Felt like it was not helping.    Denies any worsening of symptoms since being off of these medications.      Patient continues to have frequency and urgency.  He has leakage associated with urgency.  He will even have leakage associated with straining.    Denies any pain or burning urination.  Denies any odor urine.  Denies any fever or body aches.  Denies any blood in urine.    No other urinary complaints at this time.       Past Medical History:   Past Medical History:   Diagnosis Date    BPH with urinary obstruction     CAD (coronary artery disease)     Dementia     Diabetes mellitus     Hypertension     Sleep apnea        Past Surgical Historical:   Past Surgical History:   Procedure Laterality Date    CYSTOSCOPY      INTRAMEDULLARY RODDING OF FEMUR      KNEE SURGERY      right hand surgery      SHOULDER SURGERY      triple bypass          Medications:   Medication List with Changes/Refills   New Medications    VIBEGRON 75 MG TAB    Take 75 mg by mouth Daily.   Current Medications    ASPIRIN (ECOTRIN) 81 MG EC TABLET    Take 81 mg by mouth.    ATORVASTATIN (LIPITOR) 40 MG TABLET        DONEPEZIL (ARICEPT) 5 MG TABLET        DULOXETINE (CYMBALTA) 60 MG CAPSULE        ELIQUIS 5 MG TAB        FINASTERIDE  (PROSCAR) 5 MG TABLET    Take 1 tablet (5 mg total) by mouth once daily.    GABAPENTIN (NEURONTIN) 100 MG CAPSULE        LEVETIRACETAM (KEPPRA) 750 MG TAB        MEMANTINE (NAMENDA) 5 MG TAB        METFORMIN (GLUCOPHAGE-XR) 500 MG ER 24HR TABLET        METOPROLOL SUCCINATE (TOPROL-XL) 25 MG 24 HR TABLET    25 mg once daily. 1/2 tab daily    OZEMPIC 0.25 MG OR 0.5 MG(2 MG/1.5 ML) PEN INJECTOR        PRAMIPEXOLE (MIRAPEX) 0.5 MG TABLET        TAMSULOSIN (FLOMAX) 0.4 MG CAP    Take 1 capsule (0.4 mg total) by mouth once daily.    VITAMIN D (VITAMIN D3) 1000 UNITS TAB    Take 1,000 Units by mouth once daily.    ZOLEDRONIC ACID/MANNITOL-WATER (RECLAST IV)    Inject into the vein every 6 (six) months. Pt takes once a year only   Discontinued Medications    VIBEGRON 75 MG TAB    Take 75 mg by mouth once daily.        Past Social History:   Social History     Socioeconomic History    Marital status:    Tobacco Use    Smoking status: Never    Smokeless tobacco: Never   Substance and Sexual Activity    Alcohol use: Never       Allergies: Review of patient's allergies indicates:  No Known Allergies     Family History:   Family History   Problem Relation Age of Onset    No Known Problems Father     No Known Problems Mother         Review of Systems:  Review of Systems   Constitutional:  Negative for activity change and appetite change.   HENT:  Negative for congestion and dental problem.    Eyes:  Negative for visual disturbance.   Respiratory:  Negative for chest tightness and shortness of breath.    Cardiovascular:  Negative for chest pain.   Gastrointestinal:  Negative for abdominal distention and abdominal pain.   Genitourinary:  Positive for frequency and urgency. Negative for decreased urine volume, difficulty urinating, dysuria, enuresis, flank pain, genital sores, hematuria, penile discharge, penile pain, penile swelling, scrotal swelling and testicular pain.   Musculoskeletal:  Negative for back pain and neck  pain.   Skin:  Negative for color change.   Neurological:  Negative for dizziness.   Hematological:  Negative for adenopathy.   Psychiatric/Behavioral:  Negative for agitation, behavioral problems and confusion.      Physical Exam:  Physical Exam  Vitals and nursing note reviewed.   Constitutional:       Appearance: He is well-developed.   HENT:      Head: Normocephalic.   Eyes:      Pupils: Pupils are equal, round, and reactive to light.   Cardiovascular:      Rate and Rhythm: Normal rate and regular rhythm.      Heart sounds: Normal heart sounds.   Pulmonary:      Effort: Pulmonary effort is normal.      Breath sounds: Normal breath sounds.   Abdominal:      General: Bowel sounds are normal.      Palpations: Abdomen is soft.   Musculoskeletal:         General: Normal range of motion.      Cervical back: Normal range of motion and neck supple.   Skin:     General: Skin is warm and dry.   Neurological:      Mental Status: He is alert and oriented to person, place, and time.   Psychiatric:         Behavior: Behavior normal.     Urinalysis:  Normal   Bladder scan: 11 cc    Assessment/Plan:   1. BPH with obstruction:  Patient has stopped his Flomax and Proscar.    Bladder scan is good at 11 cc.  See no reason to restart medication.      2. Overactive bladder:  Patient states his pharmacy was unable to get Gemtesa.    Medication did provide relief.  Will send new prescription to a different pharmacy.    Patient provided samples for 4 weeks to he can get medication filled.    Patient will notify us if there is any issues or if the medication is too expensive.    If needed patient will try Myrbetriq.  Will provide samples.  If this medication works well then will send a prescription.      Will tentatively plan follow-up in 6 months, sooner if needed.  Problem List Items Addressed This Visit    None  Visit Diagnoses       OAB (overactive bladder)    -  Primary    Relevant Medications    vibegron 75 mg Tab    Other Relevant  Orders    POCT Urinalysis(Instrument) (Completed)    POCT Bladder Scan    BPH with urinary obstruction        Relevant Orders    POCT Bladder Scan

## 2023-11-03 DIAGNOSIS — N13.8 BPH WITH URINARY OBSTRUCTION: ICD-10-CM

## 2023-11-03 DIAGNOSIS — N40.1 BPH WITH URINARY OBSTRUCTION: ICD-10-CM

## 2023-11-03 RX ORDER — TAMSULOSIN HYDROCHLORIDE 0.4 MG/1
0.4 CAPSULE ORAL DAILY
Qty: 90 CAPSULE | Refills: 3 | Status: CANCELLED | OUTPATIENT
Start: 2023-11-03 | End: 2024-11-02

## 2023-11-03 NOTE — TELEPHONE ENCOUNTER
Notified pt that there are refills currently on the medication that is being requested at this time. Pt verbalized understanding and is contacting the pharmacy. ED

## 2023-11-03 NOTE — TELEPHONE ENCOUNTER
----- Message from Shikha Ellis sent at 11/3/2023  9:05 AM CDT -----  Contact: andria  Type:  RX Refill Request    Who Called: andria  Refill or New Rx:refill  RX Name and Strength:1)tamsulosin (FLOMAX) 0.4 mg Cap  2)oxybutin  How is the patient currently taking it? (ex. 1XDay):daily  Is this a 30 day or 90 day RX:90  Preferred Pharmacy with phone number:  [facility] - [address] [address]  Phone: [phone] Fax: [phone]          Local or Mail Order:local  Ordering Provider:lizzy  Would the patient rather a call back or a response via [facility]? odell  Best Call Back Number:[phone]  Additional Information: n/a

## 2023-11-03 NOTE — TELEPHONE ENCOUNTER
----- Message from Cate Amin sent at 11/3/2023  8:33 AM CDT -----  Contact: Diasy wife to pt  Type: Staff Message  Caller: Damien Wallace  Call Back Number: [phone]  Nature of the Call: pt is needing medication for frequent urination  Additional Information: na

## 2023-12-11 ENCOUNTER — OFFICE VISIT (OUTPATIENT)
Dept: UROLOGY | Facility: CLINIC | Age: 78
End: 2023-12-11
Payer: MEDICARE

## 2023-12-11 VITALS
BODY MASS INDEX: 24.88 KG/M2 | WEIGHT: 168 LBS | OXYGEN SATURATION: 100 % | SYSTOLIC BLOOD PRESSURE: 134 MMHG | TEMPERATURE: 98 F | HEART RATE: 74 BPM | DIASTOLIC BLOOD PRESSURE: 65 MMHG | HEIGHT: 69 IN

## 2023-12-11 DIAGNOSIS — N32.81 OAB (OVERACTIVE BLADDER): Primary | ICD-10-CM

## 2023-12-11 PROCEDURE — 3075F PR MOST RECENT SYSTOLIC BLOOD PRESS GE 130-139MM HG: ICD-10-PCS | Mod: CPTII,S$GLB,, | Performed by: FAMILY MEDICINE

## 2023-12-11 PROCEDURE — 3078F DIAST BP <80 MM HG: CPT | Mod: CPTII,S$GLB,, | Performed by: FAMILY MEDICINE

## 2023-12-11 PROCEDURE — 3078F PR MOST RECENT DIASTOLIC BLOOD PRESSURE < 80 MM HG: ICD-10-PCS | Mod: CPTII,S$GLB,, | Performed by: FAMILY MEDICINE

## 2023-12-11 PROCEDURE — 3075F SYST BP GE 130 - 139MM HG: CPT | Mod: CPTII,S$GLB,, | Performed by: FAMILY MEDICINE

## 2023-12-11 PROCEDURE — 1126F PR PAIN SEVERITY QUANTIFIED, NO PAIN PRESENT: ICD-10-PCS | Mod: CPTII,S$GLB,, | Performed by: FAMILY MEDICINE

## 2023-12-11 PROCEDURE — 99214 PR OFFICE/OUTPT VISIT, EST, LEVL IV, 30-39 MIN: ICD-10-PCS | Mod: S$GLB,,, | Performed by: FAMILY MEDICINE

## 2023-12-11 PROCEDURE — 99214 OFFICE O/P EST MOD 30 MIN: CPT | Mod: S$GLB,,, | Performed by: FAMILY MEDICINE

## 2023-12-11 PROCEDURE — 1126F AMNT PAIN NOTED NONE PRSNT: CPT | Mod: CPTII,S$GLB,, | Performed by: FAMILY MEDICINE

## 2023-12-11 NOTE — PROGRESS NOTES
Subjective:       Patient ID: Damien Wallace is a 78 y.o. male.    Chief Complaint: Urinary Frequency and Benign Prostatic Hypertrophy      HPI: 78-year-old male, established patien  Patient's history BPH with obstruction.    At last visit patient was having episodes of urinary incontinence, urinary frequency, and urinary urgency.    Patient had been on oxybutynin with no improvement.  Start the patient on Gemtesa 75 mg daily.  After being on samples patient had improvement in symptoms.   In the past patient has tried Flomax and finasteride as well which did not improve his symptoms.  The patient does have some mobility issues which I believe may be contributing to his urine leakage.    Patient continues to have frequency and urgency.  He has leakage associated with urgency.  He will even have leakage associated with straining.    Denies any pain or burning urination.  Denies any odor urine.  Denies any fever or body aches.  Denies any blood in urine.    No other urinary complaints at this time.         Past Medical History:   Past Medical History:   Diagnosis Date    BPH with urinary obstruction     CAD (coronary artery disease)     Dementia     Diabetes mellitus     Hypertension     OAB (overactive bladder)     Sleep apnea        Past Surgical Historical:   Past Surgical History:   Procedure Laterality Date    CYSTOSCOPY      INTRAMEDULLARY RODDING OF FEMUR      KNEE SURGERY      right hand surgery      SHOULDER SURGERY      triple bypass          Medications:   Medication List with Changes/Refills   Current Medications    ASPIRIN (ECOTRIN) 81 MG EC TABLET    Take 81 mg by mouth.    ATORVASTATIN (LIPITOR) 40 MG TABLET        DONEPEZIL (ARICEPT) 5 MG TABLET        DULOXETINE (CYMBALTA) 60 MG CAPSULE        ELIQUIS 5 MG TAB        FINASTERIDE (PROSCAR) 5 MG TABLET    Take 1 tablet (5 mg total) by mouth once daily.    GABAPENTIN (NEURONTIN) 100 MG CAPSULE        LEVETIRACETAM (KEPPRA) 750 MG TAB        MEMANTINE  (NAMENDA) 5 MG TAB        METFORMIN (GLUCOPHAGE-XR) 500 MG ER 24HR TABLET        METOPROLOL SUCCINATE (TOPROL-XL) 25 MG 24 HR TABLET    25 mg once daily. 1/2 tab daily    OZEMPIC 0.25 MG OR 0.5 MG(2 MG/1.5 ML) PEN INJECTOR        PRAMIPEXOLE (MIRAPEX) 0.5 MG TABLET        TAMSULOSIN (FLOMAX) 0.4 MG CAP    Take 1 capsule (0.4 mg total) by mouth once daily.    VITAMIN D (VITAMIN D3) 1000 UNITS TAB    Take 1,000 Units by mouth once daily.    ZOLEDRONIC ACID/MANNITOL-WATER (RECLAST IV)    Inject into the vein every 6 (six) months. Pt takes once a year only   Changed and/or Refilled Medications    Modified Medication Previous Medication    VIBEGRON 75 MG TAB vibegron 75 mg Tab       Take 75 mg by mouth Daily.    Take 75 mg by mouth Daily.        Past Social History:   Social History     Socioeconomic History    Marital status:    Tobacco Use    Smoking status: Never    Smokeless tobacco: Never   Substance and Sexual Activity    Alcohol use: Never       Allergies: Review of patient's allergies indicates:  No Known Allergies     Family History:   Family History   Problem Relation Age of Onset    No Known Problems Father     No Known Problems Mother         Review of Systems:  Review of Systems   HENT:  Negative for congestion and dental problem.    Respiratory:  Negative for chest tightness and shortness of breath.    Genitourinary:  Positive for frequency and urgency.       Physical Exam:  Physical Exam  Vitals and nursing note reviewed.   Constitutional:       Appearance: He is well-developed.   HENT:      Head: Normocephalic.   Eyes:      Pupils: Pupils are equal, round, and reactive to light.   Cardiovascular:      Rate and Rhythm: Regular rhythm.      Heart sounds: Normal heart sounds.   Pulmonary:      Effort: Pulmonary effort is normal.      Breath sounds: Normal breath sounds.   Abdominal:      General: Bowel sounds are normal.      Palpations: Abdomen is soft.   Musculoskeletal:         General: Normal  range of motion.      Cervical back: Normal range of motion and neck supple.   Skin:     General: Skin is warm and dry.   Neurological:      Mental Status: He is alert and oriented to person, place, and time.   Psychiatric:         Behavior: Behavior normal.       Urinalysis:  Normal   Bladder scan: 11 cc    Assessment/Plan:   1. BPH with obstruction:  Patient has stopped his Flomax and Proscar.    Bladder scan is good at 37 cc.  See no reason to restart medication.      2. Overactive bladder:   Gemtesa prescription sent to the patient's pharmacy.    Patient will notify us if there is any issues or if the medication is too expensive.    If needed patient will try Myrbetriq.  Will provide samples.  Encouraged patient to practice timed voiding and possibly utilize a urinal due to mobility concerns. .    Will tentatively plan follow-up in one year, sooner if needed.  Problem List Items Addressed This Visit    None  Visit Diagnoses       OAB (overactive bladder)    -  Primary    Relevant Medications    vibegron 75 mg Tab

## 2024-03-28 ENCOUNTER — TELEPHONE (OUTPATIENT)
Dept: UROLOGY | Facility: CLINIC | Age: 79
End: 2024-03-28
Payer: MEDICARE

## 2024-03-28 NOTE — TELEPHONE ENCOUNTER
Contacted, spoke with spouse advised to have pharmacy send PA for med. Spouse verbalized understanding. BJCYNDIE    ----- Message from Magalie Moore sent at 3/28/2024 11:28 AM CDT -----  Contact: self  Patient is requesting a call back regarding medication - vibegron 75 mg Tab, needs prior authorization. Please call back at [phone]

## 2024-04-03 ENCOUNTER — TELEPHONE (OUTPATIENT)
Dept: UROLOGY | Facility: CLINIC | Age: 79
End: 2024-04-03
Payer: MEDICARE

## 2024-04-03 NOTE — TELEPHONE ENCOUNTER
Contacted spouse in regards to approval of PA. Med is still $100, too expensive for family. Per note NP/BL  would like to proceed with Myrbetriq if failled. Discussed, advised spouse to p/u samples. 4 wks of samples given per NP/BL. BJP

## 2024-04-17 ENCOUNTER — TELEPHONE (OUTPATIENT)
Dept: UROLOGY | Facility: CLINIC | Age: 79
End: 2024-04-17
Payer: MEDICARE

## 2024-04-17 DIAGNOSIS — N32.81 OAB (OVERACTIVE BLADDER): Primary | ICD-10-CM

## 2024-04-17 RX ORDER — MIRABEGRON 50 MG/1
50 TABLET, FILM COATED, EXTENDED RELEASE ORAL DAILY
Qty: 30 TABLET | Refills: 11 | Status: SHIPPED | OUTPATIENT
Start: 2024-04-17 | End: 2025-04-17

## 2024-04-17 NOTE — TELEPHONE ENCOUNTER
Contacted, confirmed that meds are working, script sent to pharmacy of choice. BJP    ----- Message from Ellyn Benedicto sent at 4/17/2024  9:07 AM CDT -----  Regarding: New Prescription  Contact: Alyssa, wife  Per phone call with Alyssa, she stated that her  was given some samples of Myrbetriq 50mg and she would like for a prescription to be sent to the pharmacy.  Please return call at [phone].      [facility] - [address] [address]  Phone: [phone] Fax: [phone]              Thanks,  SJ

## 2024-06-10 ENCOUNTER — TELEPHONE (OUTPATIENT)
Dept: UROLOGY | Facility: CLINIC | Age: 79
End: 2024-06-10
Payer: MEDICARE

## 2024-06-10 DIAGNOSIS — R39.15 URINARY URGENCY: Primary | ICD-10-CM

## 2024-06-10 DIAGNOSIS — N32.81 OAB (OVERACTIVE BLADDER): ICD-10-CM

## 2024-06-10 RX ORDER — SOLIFENACIN SUCCINATE 10 MG/1
10 TABLET, FILM COATED ORAL DAILY
Qty: 30 TABLET | Refills: 11 | Status: SHIPPED | OUTPATIENT
Start: 2024-06-10 | End: 2025-06-10

## 2024-06-10 NOTE — TELEPHONE ENCOUNTER
Pt called stating myrbetriq is not working. Pt had tried gemtesa. We will send vesicare to pharmacy.    ----- Message from Shikha Ellis sent at 6/10/2024  3:18 PM CDT -----  Contact: andria avalosabegron (MYRBETRIQ) 50 mg Tb24  Medication not working    [facility] - [address] [address]  Phone: [phone] Fax: [phone]  Pls call [phone] with any questions

## 2024-12-11 ENCOUNTER — OFFICE VISIT (OUTPATIENT)
Dept: UROLOGY | Facility: CLINIC | Age: 79
End: 2024-12-11
Payer: MEDICARE

## 2024-12-11 VITALS
BODY MASS INDEX: 24.9 KG/M2 | HEIGHT: 69 IN | SYSTOLIC BLOOD PRESSURE: 161 MMHG | DIASTOLIC BLOOD PRESSURE: 77 MMHG | HEART RATE: 71 BPM | WEIGHT: 168.13 LBS

## 2024-12-11 DIAGNOSIS — R39.15 URINARY URGENCY: Primary | ICD-10-CM

## 2024-12-11 PROCEDURE — 3288F FALL RISK ASSESSMENT DOCD: CPT | Mod: CPTII,,, | Performed by: FAMILY MEDICINE

## 2024-12-11 PROCEDURE — 1100F PTFALLS ASSESS-DOCD GE2>/YR: CPT | Mod: CPTII,,, | Performed by: FAMILY MEDICINE

## 2024-12-11 PROCEDURE — 3078F DIAST BP <80 MM HG: CPT | Mod: CPTII,,, | Performed by: FAMILY MEDICINE

## 2024-12-11 PROCEDURE — 99214 OFFICE O/P EST MOD 30 MIN: CPT | Mod: S$PBB,,, | Performed by: FAMILY MEDICINE

## 2024-12-11 PROCEDURE — 3077F SYST BP >= 140 MM HG: CPT | Mod: CPTII,,, | Performed by: FAMILY MEDICINE

## 2024-12-11 NOTE — PROGRESS NOTES
Subjective:       Patient ID: Damien Wallace is a 79 y.o. male.    Chief Complaint: No chief complaint on file.      HPI: 79-year-old male patient presenting to the clinic to follow up for urinary urgency and frequency.  Patient has a history of BPH with obstruction.  Most recently use having episodes of urinary incontinence, frequency, and urgency he has been prescribed oxybutynin with no improvement.  He has tried Flomax and finasteride in the past which he states did not improve his symptoms so he discontinued.  He does have some mobility issues which I believe may be contributing to his incontinence.  In the past he was prescribed Gemtesa which worked well however it was not covered by his insurance at that time.  He has been taking VESIcare which does help some         Past Medical History:   Past Medical History:   Diagnosis Date    BPH with urinary obstruction     CAD (coronary artery disease)     Dementia     Diabetes mellitus     Hypertension     OAB (overactive bladder)     Sleep apnea        Past Surgical Historical:   Past Surgical History:   Procedure Laterality Date    CYSTOSCOPY      INTRAMEDULLARY RODDING OF FEMUR      KNEE SURGERY      right hand surgery      SHOULDER SURGERY      triple bypass          Medications:   Medication List with Changes/Refills   New Medications    VIBEGRON 75 MG TAB    Take 1 tablet (75 mg total) by mouth once daily.   Current Medications    ASPIRIN (ECOTRIN) 81 MG EC TABLET    Take 81 mg by mouth.    ATORVASTATIN (LIPITOR) 40 MG TABLET        DONEPEZIL (ARICEPT) 5 MG TABLET        DULOXETINE (CYMBALTA) 60 MG CAPSULE        ELIQUIS 5 MG TAB        FINASTERIDE (PROSCAR) 5 MG TABLET    Take 1 tablet (5 mg total) by mouth once daily.    GABAPENTIN (NEURONTIN) 100 MG CAPSULE        LEVETIRACETAM (KEPPRA) 750 MG TAB        MEMANTINE (NAMENDA) 5 MG TAB        METFORMIN (GLUCOPHAGE-XR) 500 MG ER 24HR TABLET        METOPROLOL SUCCINATE (TOPROL-XL) 25 MG 24 HR TABLET    25 mg once  daily. 1/2 tab daily    MIRABEGRON (MYRBETRIQ) 50 MG TB24    Take 1 tablet (50 mg total) by mouth once daily.    OZEMPIC 0.25 MG OR 0.5 MG(2 MG/1.5 ML) PEN INJECTOR        PRAMIPEXOLE (MIRAPEX) 0.5 MG TABLET        SOLIFENACIN (VESICARE) 10 MG TABLET    Take 1 tablet (10 mg total) by mouth once daily.    TAMSULOSIN (FLOMAX) 0.4 MG CAP    Take 1 capsule (0.4 mg total) by mouth once daily.    VITAMIN D (VITAMIN D3) 1000 UNITS TAB    Take 1,000 Units by mouth once daily.    ZOLEDRONIC ACID/MANNITOL-WATER (RECLAST IV)    Inject into the vein every 6 (six) months. Pt takes once a year only        Past Social History:   Social History     Socioeconomic History    Marital status:    Tobacco Use    Smoking status: Never    Smokeless tobacco: Never   Substance and Sexual Activity    Alcohol use: Never       Allergies: Review of patient's allergies indicates:  No Known Allergies     Family History:   Family History   Problem Relation Name Age of Onset    No Known Problems Father      No Known Problems Mother          Review of Systems:  Review of Systems   Constitutional: Negative.    HENT: Negative.     Eyes: Negative.    Respiratory: Negative.     Cardiovascular: Negative.    Gastrointestinal: Negative.    Endocrine: Negative.    Genitourinary:  Positive for frequency and urgency.   Musculoskeletal: Negative.    Skin: Negative.    Allergic/Immunologic: Negative.    Neurological: Negative.    Hematological: Negative.    Psychiatric/Behavioral: Negative.         Physical Exam:  Physical Exam  Constitutional:       Appearance: He is normal weight.   HENT:      Head: Normocephalic.      Nose: Nose normal.      Mouth/Throat:      Mouth: Mucous membranes are moist.      Pharynx: Oropharynx is clear.   Eyes:      Extraocular Movements: Extraocular movements intact.      Conjunctiva/sclera: Conjunctivae normal.      Pupils: Pupils are equal, round, and reactive to light.   Cardiovascular:      Rate and Rhythm: Normal rate  and regular rhythm.      Pulses: Normal pulses.      Heart sounds: Normal heart sounds.   Pulmonary:      Effort: Pulmonary effort is normal.      Breath sounds: Normal breath sounds.   Abdominal:      General: Abdomen is flat. Bowel sounds are normal.      Palpations: Abdomen is soft.      Hernia: There is no hernia in the right inguinal area or left inguinal area.   Genitourinary:     Penis: Normal. No phimosis, paraphimosis, hypospadias, erythema, tenderness or discharge.       Testes: Normal.         Right: Mass, tenderness or swelling not present. Right testis is descended. Cremasteric reflex is present.          Left: Mass, tenderness or swelling not present. Left testis is descended. Cremasteric reflex is present.       Prostate: Normal.      Rectum: Normal.   Musculoskeletal:         General: Normal range of motion.      Cervical back: Normal range of motion and neck supple.   Lymphadenopathy:      Lower Body: No right inguinal adenopathy. No left inguinal adenopathy.   Skin:     General: Skin is warm and dry.      Capillary Refill: Capillary refill takes less than 2 seconds.   Neurological:      General: No focal deficit present.      Mental Status: He is alert and oriented to person, place, and time. Mental status is at baseline.   Psychiatric:         Mood and Affect: Mood normal.         Behavior: Behavior normal.         Thought Content: Thought content normal.         Judgment: Judgment normal.         Assessment/Plan:     Urinary urge incontinence: Bladder scan today 19 mL postvoid.  Patient would like to attempt to get Gemtesa covered by his insurance.  He has tried Flomax finasteride as well as Myrbetriq and oxybutynin in the past.  If the patient is unable to get Gemtesa covered we can send prescription of VESIcare to his pharmacy.  Follow up in 1 year  Problem List Items Addressed This Visit    None  Visit Diagnoses       Urinary urgency    -  Primary

## 2025-04-21 ENCOUNTER — TELEPHONE (OUTPATIENT)
Dept: UROLOGY | Facility: CLINIC | Age: 80
End: 2025-04-21
Payer: MEDICARE

## 2025-04-21 DIAGNOSIS — N32.81 OAB (OVERACTIVE BLADDER): Primary | ICD-10-CM

## 2025-04-21 NOTE — TELEPHONE ENCOUNTER
Refill sent.    ----- Message from Nithya sent at 4/21/2025  1:48 PM CDT -----  Contact: LEIGH FATIMA [[id number]]  ..TYPE: Patient Requesting Refill Who Called:  LEIGH FATIMA [[id number]]Refill or New Rx: refillRX Name and Strength: gentesa, not on file.How is the patient currently taking it? (ex. 1XDay): Is this a 30 day or 90 day RX: as prescribed Preferred Pharmacy with phone number: [facility] [place] - [address] [facility] [facility] [address]Eseep: [phone] Fax: [phone] or Mail Order: localOrdering Provider: Alistair the patient rather a call back or a response via [facility]?  callBest Call Back Number: [phone] (home) Additional Information:

## 2025-05-09 ENCOUNTER — TELEPHONE (OUTPATIENT)
Dept: UROLOGY | Facility: CLINIC | Age: 80
End: 2025-05-09
Payer: MEDICARE

## 2025-05-09 NOTE — TELEPHONE ENCOUNTER
Left voicemail for Mariaelena to return call to clinic regarding PA. Provided call back number.     ----- Message from Funmilayo sent at 4/22/2025  7:25 AM CDT -----  Regarding: FW: p/a for gemtesa medication    ----- Message -----  From: Dulce Escamilla  Sent: 4/21/2025   3:59 PM CDT  To: Pancho CHADWICK Staff  Subject: p/a for gemtesa medication                       [facility] calling stating script for Gemtesa needs a p/a to be filled.  She can be reached at [phone] and fax [phone],